# Patient Record
Sex: FEMALE | Race: WHITE | Employment: UNEMPLOYED
[De-identification: names, ages, dates, MRNs, and addresses within clinical notes are randomized per-mention and may not be internally consistent; named-entity substitution may affect disease eponyms.]

---

## 2017-09-17 ENCOUNTER — HEALTH MAINTENANCE LETTER (OUTPATIENT)
Age: 42
End: 2017-09-17

## 2017-12-06 ENCOUNTER — COMMITTEE REVIEW (OUTPATIENT)
Dept: TRANSPLANT | Facility: CLINIC | Age: 42
End: 2017-12-06

## 2017-12-06 NOTE — COMMITTEE REVIEW
Abdominal Committee Review Note     Evaluation Date:   Committee Review Date: 12-6-17  Organ being evaluated for: was a donor    Transplant Phase:   Transplant Status:     Transplant Coordinator: No matching coordinators  Transplant Surgeon:       Referring Physician: Referred Self    Primary Diagnosis:   Secondary Diagnosis:     Committee Review Members:  Dima Meredith Willey, Larson, Wiseman, Riad, Keys, Kondziolka, Martin     Transplant Eligibility:     Committee Review Decision: recipient has cystic nephroma.  Dr. Meredith with call Staci,  I will order ultrasound and labs on donor.  Donor will be scheduled to see nephrologist for counseling.    Relative Contraindications:     Absolute Contraindications:     Committee Chair Masoud verbally attested to the committee's decision.

## 2017-12-12 ENCOUNTER — TELEPHONE (OUTPATIENT)
Dept: TRANSPLANT | Facility: CLINIC | Age: 42
End: 2017-12-12

## 2017-12-12 NOTE — TELEPHONE ENCOUNTER
Patient Call: Transplant   Reason for call: Staci #[phone]   Left voice message 12/12/17 art 0725 that Dr. Matty Meredith wants her to come in for a U/S of her kidney.

## 2017-12-14 ENCOUNTER — TELEPHONE (OUTPATIENT)
Dept: TRANSPLANT | Facility: CLINIC | Age: 42
End: 2017-12-14

## 2017-12-14 DIAGNOSIS — Z52.4 KIDNEY DONOR: Primary | ICD-10-CM

## 2017-12-22 ENCOUNTER — OFFICE VISIT (OUTPATIENT)
Dept: NEPHROLOGY | Facility: CLINIC | Age: 42
End: 2017-12-22
Attending: INTERNAL MEDICINE

## 2017-12-22 ENCOUNTER — RADIANT APPOINTMENT (OUTPATIENT)
Dept: ULTRASOUND IMAGING | Facility: CLINIC | Age: 42
End: 2017-12-22
Attending: SURGERY

## 2017-12-22 VITALS
RESPIRATION RATE: 18 BRPM | SYSTOLIC BLOOD PRESSURE: 122 MMHG | DIASTOLIC BLOOD PRESSURE: 74 MMHG | HEART RATE: 64 BPM | TEMPERATURE: 98.1 F | WEIGHT: 130.1 LBS | HEIGHT: 65 IN | BODY MASS INDEX: 21.67 KG/M2

## 2017-12-22 DIAGNOSIS — Z52.4 KIDNEY DONOR: ICD-10-CM

## 2017-12-22 DIAGNOSIS — Z90.5 HISTORY OF UNILATERAL NEPHRECTOMY: Primary | ICD-10-CM

## 2017-12-22 PROCEDURE — 99212 OFFICE O/P EST SF 10 MIN: CPT | Mod: ZF

## 2017-12-22 ASSESSMENT — PAIN SCALES - GENERAL: PAINLEVEL: NO PAIN (0)

## 2017-12-22 NOTE — MR AVS SNAPSHOT
"              After Visit Summary   12/22/2017    Staci Ferguson    MRN: [id number]           Patient Information     Date Of Birth          1975        Visit Information        Provider Department      12/22/2017 9:00 AM Arvin Haque MD [facility] Nephrology        Today's Diagnoses     History of unilateral nephrectomy    -  1       Follow-ups after your visit        Who to contact     If you have questions or need follow up information about today's clinic visit or your schedule please contact [facility] NEPHROLOG[facility] directly at [phone].  Normal or non-critical lab and imaging results will be communicated to you by MyChart, letter or phone within 4 business days after the clinic has received the results. If you do not hear from us within 7 days, please contact the clinic through [employer] or phone. If you have a critical or abnormal lab result, we will notify you by phone as soon as possible.  Submit refill requests through [employer] or call your pharmacy and they will forward the refill request to us. Please allow 3 business days for your refill to be completed.          Additional Information About Your Visit        MyChart Information     [employer] gives you secure access to your electronic health record. If you see a primary care provider, you can also send messages to your care team and make appointments. If you have questions, please call your primary care clinic.  If you do not have a primary care provider, please call [phone] and they will assist you.        Care EveryWhere ID     This is your Care EveryWhere ID. This could be used by other organizations to access your [place] medical records  [id number]        Your Vitals Were     Pulse Temperature Respirations Height BMI (Body Mass Index)       64 98.1  F (36.7  C) (Oral) 18 1.651 m (5' 5\") 21.65 kg/m2        Blood Pressure from Last 3 Encounters:   12/22/17 122/74   07/22/13 116/74   06/24/13 115/79    Weight from Last 3 Encounters: "   12/22/17 59 kg (130 lb 1.6 oz)   07/22/13 57.3 kg (126 lb 6.4 oz)   06/24/13 57.6 kg (127 lb)              Today, you had the following     No orders found for display       Primary Care Provider Office Phone # Fax #    [facility] [phone] [address] [address]        Equal Access to Services     MIKI JEAN : Hadii aad ku hadasho Soomaali, waaxda luqadaha, qaybta kaalmada adeegyada, waxay sybilin haymacarion anton longoriadatbeto cobos . So [facility] [phone].    ATENCIÓN: Si habla español, tiene a herrera disposición servicios gratuitos de asistencia lingüística. Llame al [phone].    We comply with applicable federal civil rights laws and [place] laws. We do not discriminate on the basis of race, color, national origin, age, disability, sex, sexual orientation, or gender identity.            Thank you!     Thank you for choosing [facility]  for your care. Our goal is always to provide you with excellent care. Hearing back from our patients is one way we can continue to improve our services. Please take a few minutes to complete the written survey that you may receive in the mail after your visit with us. Thank you!             Your Updated Medication List - Protect others around you: Learn how to safely use, store and throw away your medicines at www.disposemymeds.org.          This list is accurate as of: 12/22/17  1:39 PM.  Always use your most recent med list.                   Brand Name Dispense Instructions for use Diagnosis    NO ACTIVE MEDICATIONS

## 2017-12-22 NOTE — PROGRESS NOTES
Assessment and Plan:   1. 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 The literature would support a low likelihood of being present bilaterally and with a negative scan 4 years post donation, It would be reasonable to return to screening with u/s for cause (hematuria or flank pain).  There does not appear to be malignant potential to this lesion and as such, we will forego interval screening. All questions were answered. I spent 15 minutes with this patient of which > 10 minutes was spent face to face counseling regarding etiology, workup and management of multiple cystic nephroma in donated kidney. All questions answered.     Assessment and plan was discussed with patient and she voiced her understanding and agreement.    Consult:  Staci Ferguson was seen in consultation at the request of Dr. Meredith for discussion of multiple cystic nephroma in donated kidney.    Reason for Visit/HPI  Staci Ferguson is a 42 year old female with Hx of kidney donation, who presents for f/u after her donated kidney was found to have multiple cystic nephroma.    She currently feels well. No complaints. No hematuria, no hypertension. No flank pain. She denies cp, sob, no n/v/d, no f/s/c and is at her baseline.     Renal u/s shows the remaining R kidney without discernable abnormality.     ROS:   A comprehensive review of systems was obtained and negative, except as noted in the HPI or PMH.    Active Medical Problems:  Patient Active Problem List   Diagnosis     Transplant donor evaluation     Kidney donor     PMH:   Medical record was reviewed and PMH was discussed with patient and noted below.  Prior kidney donor.    PSH:   Past Surgical History:   Procedure Laterality Date     C ORAL SURGERY PROCEDURE  1993     LAPAROSCOPIC DONOR HAND ASSISTED KIDNEY LIVING UNRELATED  6/7/2013    Procedure: LAPAROSCOPIC DONOR HAND ASSISTED KIDNEY  "LIVING UNRELATED;  Left Laparoscopic Nephrectomy, Unrelated Donor  Anesthesia general with block;  Surgeon: Dima Moyer MD;  Location: [facility] OR     Family Hx:   No known fam hx of kidney disease.    Personal Hx:   Social History     Social History     Marital status:      Spouse name: N/A     Number of children: N/A     Years of education: N/A     Occupational History     Not on file.     Social History Main Topics     Smoking status: Never Smoker     Smokeless tobacco: Never Used     Alcohol use Yes      Comment: 4 glasses/wk     Drug use: No     Sexual activity: Not on file     Other Topics Concern     Not on file     Social History Narrative     Allergies:  No Known Allergies    Medications:  Prior to Admission medications    Medication Sig Start Date End Date Taking? Authorizing Provider   NO ACTIVE MEDICATIONS    Yes Reported, Patient     Vitals:  /74  Pulse 64  Temp 98.1  F (36.7  C) (Oral)  Resp 18  Ht 1.651 m (5' 5\")  Wt 59 kg (130 lb 1.6 oz)  BMI 21.65 kg/m2    Exam:  GENERAL APPEARANCE: alert and no distress  HENT: mouth without ulcers or lesions  LYMPHATICS: no cervical or supraclavicular nodes  RESP: lungs clear to auscultation - no rales, rhonchi or wheezes  CV: regular rhythm, normal rate, no rub, no murmur  EDEMA: no LE edema bilaterally  ABDOMEN: soft, nondistended, nontender, bowel sounds normal  MS: extremities normal - no gross deformities noted, no evidence of inflammation in joints, no muscle tenderness  SKIN: no rash    Results:  Labs reviewed with patient.   "

## 2017-12-22 NOTE — LETTER
12/22/2017      RE: Staci Ferguson  [address] [address]       Assessment and Plan:   1. 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 The literature would support a low likelihood of being present bilaterally and with a negative scan 4 years post donation, It would be reasonable to return to screening with u/s for cause (hematuria or flank pain).  There does not appear to be malignant potential to this lesion and as such, we will forego interval screening. All questions were answered. I spent 15 minutes with this patient of which > 10 minutes was spent face to face counseling regarding etiology, workup and management of multiple cystic nephroma in donated kidney. All questions answered.     Assessment and plan was discussed with patient and she voiced her understanding and agreement.    Consult:  Staci Ferguson was seen in consultation at the request of Dr. Meredith for discussion of multiple cystic nephroma in donated kidney.    Reason for Visit/HPI  Staci Ferguson is a 42 year old female with Hx of kidney donation, who presents for f/u after her donated kidney was found to have multiple cystic nephroma.    She currently feels well. No complaints. No hematuria, no hypertension. No flank pain. She denies cp, sob, no n/v/d, no f/s/c and is at her baseline.     Renal u/s shows the remaining R kidney without discernable abnormality.     ROS:   A comprehensive review of systems was obtained and negative, except as noted in the HPI or PMH.    Active Medical Problems:  Patient Active Problem List   Diagnosis     Transplant donor evaluation     Kidney donor     PMH:   Medical record was reviewed and PMH was discussed with patient and noted below.  Prior kidney donor.    PSH:   Past Surgical History:   Procedure Laterality Date     C ORAL SURGERY PROCEDURE  1993     LAPAROSCOPIC DONOR HAND ASSISTED KIDNEY  "LIVING UNRELATED  6/7/2013    Procedure: LAPAROSCOPIC DONOR HAND ASSISTED KIDNEY LIVING UNRELATED;  Left Laparoscopic Nephrectomy, Unrelated Donor  Anesthesia general with block;  Surgeon: Dima Moyer MD;  Location: [facility] OR     Family Hx:   No known fam hx of kidney disease.    Personal Hx:   Social History     Social History     Marital status:      Spouse name: N/A     Number of children: N/A     Years of education: N/A     Occupational History     Not on file.     Social History Main Topics     Smoking status: Never Smoker     Smokeless tobacco: Never Used     Alcohol use Yes      Comment: 4 glasses/wk     Drug use: No     Sexual activity: Not on file     Other Topics Concern     Not on file     Social History Narrative     Allergies:  No Known Allergies    Medications:  Prior to Admission medications    Medication Sig Start Date End Date Taking? Authorizing Provider   NO ACTIVE MEDICATIONS    Yes Reported, Patient     Vitals:  /74  Pulse 64  Temp 98.1  F (36.7  C) (Oral)  Resp 18  Ht 1.651 m (5' 5\")  Wt 59 kg (130 lb 1.6 oz)  BMI 21.65 kg/m2    Exam:  GENERAL APPEARANCE: alert and no distress  HENT: mouth without ulcers or lesions  LYMPHATICS: no cervical or supraclavicular nodes  RESP: lungs clear to auscultation - no rales, rhonchi or wheezes  CV: regular rhythm, normal rate, no rub, no murmur  EDEMA: no LE edema bilaterally  ABDOMEN: soft, nondistended, nontender, bowel sounds normal  MS: extremities normal - no gross deformities noted, no evidence of inflammation in joints, no muscle tenderness  SKIN: no rash    Results:  Labs reviewed with patient.     Arvin Haque MD      "

## 2017-12-22 NOTE — NURSING NOTE
"Chief Complaint   Patient presents with     RECHECK     DONOR EVAL       Initial /74  Pulse 64  Temp 98.1  F (36.7  C) (Oral)  Resp 18  Ht 1.651 m (5' 5\")  Wt 59 kg (130 lb 1.6 oz)  BMI 21.65 kg/m2 Estimated body mass index is 21.65 kg/(m^2) as calculated from the following:    Height as of this encounter: 1.651 m (5' 5\").    Weight as of this encounter: 59 kg (130 lb 1.6 oz).  Medication Reconciliation: COMPLETE  CARLOS SMITH CMA      "

## 2020-02-23 ENCOUNTER — HEALTH MAINTENANCE LETTER (OUTPATIENT)
Age: 45
End: 2020-02-23

## 2020-12-06 ENCOUNTER — HEALTH MAINTENANCE LETTER (OUTPATIENT)
Age: 45
End: 2020-12-06

## 2021-04-11 ENCOUNTER — HEALTH MAINTENANCE LETTER (OUTPATIENT)
Age: 46
End: 2021-04-11

## 2021-05-31 ENCOUNTER — RECORDS - HEALTHEAST (OUTPATIENT)
Dept: ADMINISTRATIVE | Facility: CLINIC | Age: 46
End: 2021-05-31

## 2021-09-25 ENCOUNTER — HEALTH MAINTENANCE LETTER (OUTPATIENT)
Age: 46
End: 2021-09-25

## 2022-05-07 ENCOUNTER — HEALTH MAINTENANCE LETTER (OUTPATIENT)
Age: 47
End: 2022-05-07

## 2023-04-22 ENCOUNTER — HEALTH MAINTENANCE LETTER (OUTPATIENT)
Age: 48
End: 2023-04-22

## 2023-06-02 ENCOUNTER — HEALTH MAINTENANCE LETTER (OUTPATIENT)
Age: 48
End: 2023-06-02